# Patient Record
Sex: MALE | Race: WHITE | NOT HISPANIC OR LATINO | Employment: FULL TIME | RURAL
[De-identification: names, ages, dates, MRNs, and addresses within clinical notes are randomized per-mention and may not be internally consistent; named-entity substitution may affect disease eponyms.]

---

## 2022-08-11 ENCOUNTER — OFFICE VISIT (OUTPATIENT)
Dept: FAMILY MEDICINE | Facility: CLINIC | Age: 32
End: 2022-08-11
Payer: COMMERCIAL

## 2022-08-11 VITALS
OXYGEN SATURATION: 95 % | DIASTOLIC BLOOD PRESSURE: 75 MMHG | WEIGHT: 203.63 LBS | HEART RATE: 100 BPM | SYSTOLIC BLOOD PRESSURE: 121 MMHG

## 2022-08-11 DIAGNOSIS — Z28.21 VACCINATION NOT CARRIED OUT BECAUSE OF PATIENT REFUSAL: ICD-10-CM

## 2022-08-11 DIAGNOSIS — F90.9 ATTENTION DEFICIT HYPERACTIVITY DISORDER (ADHD), UNSPECIFIED ADHD TYPE: Primary | ICD-10-CM

## 2022-08-11 PROCEDURE — 99203 OFFICE O/P NEW LOW 30 MIN: CPT | Mod: GC,,, | Performed by: SPECIALIST

## 2022-08-11 PROCEDURE — 3078F PR MOST RECENT DIASTOLIC BLOOD PRESSURE < 80 MM HG: ICD-10-PCS | Mod: CPTII,GC,, | Performed by: SPECIALIST

## 2022-08-11 PROCEDURE — 99203 PR OFFICE/OUTPT VISIT, NEW, LEVL III, 30-44 MIN: ICD-10-PCS | Mod: GC,,, | Performed by: SPECIALIST

## 2022-08-11 PROCEDURE — 3074F SYST BP LT 130 MM HG: CPT | Mod: CPTII,GC,, | Performed by: SPECIALIST

## 2022-08-11 PROCEDURE — 3074F PR MOST RECENT SYSTOLIC BLOOD PRESSURE < 130 MM HG: ICD-10-PCS | Mod: CPTII,GC,, | Performed by: SPECIALIST

## 2022-08-11 PROCEDURE — 3078F DIAST BP <80 MM HG: CPT | Mod: CPTII,GC,, | Performed by: SPECIALIST

## 2022-08-11 RX ORDER — DEXTROAMPHETAMINE SACCHARATE, AMPHETAMINE ASPARTATE MONOHYDRATE, DEXTROAMPHETAMINE SULFATE AND AMPHETAMINE SULFATE 6.25; 6.25; 6.25; 6.25 MG/1; MG/1; MG/1; MG/1
CAPSULE, EXTENDED RELEASE ORAL
COMMUNITY

## 2022-08-11 NOTE — PROGRESS NOTES
Subjective:       Patient ID: Yogi Pedraza is a 31 y.o. male.    Chief Complaint: Medication Refill (ADDERALL)    Patient is a 31 year old male that presents to the clinic today for medication refill. Patient has a PMH of ADHD. Patient has previously well controlled on Adderall XL 25mg. Patient denies headache, chest pain or SOB. 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 The patient states he is moving to [place] and is not interested in completing the health maintenance items.         Current Outpatient Medications:     dextroamphetamine-amphetamine (ADDERALL XR) 25 MG 24 hr capsule, dextroamphetamine-amphetamine ER 25 mg 24hr capsule,extend release  Take 1 capsule every day by oral route in the morning for 30 days.  for focus, Disp: , Rfl:     Review of patient's allergies indicates:  No Known Allergies    Past Medical History:   Diagnosis Date    ADHD (attention deficit hyperactivity disorder)        History reviewed. No pertinent surgical history.    Family History   Family history unknown: Yes       Social History     Tobacco Use    Smoking status: Former Smoker    Smokeless tobacco: Never Used   Substance Use Topics    Alcohol use: Not Currently    Drug use: Not Currently       Review of Systems   Constitutional: Negative for activity change, appetite change and fever.   HENT: Negative for ear discharge, hearing loss and rhinorrhea.    Eyes: Negative for pain, redness and eye dryness.   Respiratory: Negative for cough, chest tightness and shortness of breath.    Cardiovascular: Negative for chest pain.   Gastrointestinal: Negative for abdominal distention and abdominal pain.   Genitourinary: Negative for difficulty urinating.   Musculoskeletal: Negative for back pain.   Neurological: Negative for seizures.   Psychiatric/Behavioral: Negative for confusion, dysphoric mood and hallucinations. The patient is not hyperactive.          Current Medications:    Medication List with Changes/Refills   Current Medications    DEXTROAMPHETAMINE-AMPHETAMINE (ADDERALL XR) 25 MG 24 HR CAPSULE    dextroamphetamine-amphetamine ER 25 mg 24hr capsule,extend release   Take 1 capsule every day by oral route in the morning for 30 days.   for focus       Start Date: --        End Date: --            Objective:        Vitals:    08/11/22 1615   BP: 121/75   Pulse: 100   SpO2: 95%   Weight: 92.4 kg (203 lb 9.6 oz)       Physical Exam  Constitutional:       General: He is not in acute distress.     Appearance: Normal appearance. He is normal weight. He is not ill-appearing.   HENT:      Head: Normocephalic and atraumatic.      Mouth/Throat:      Pharynx: Oropharynx is clear.   Eyes:      Conjunctiva/sclera: Conjunctivae normal.   Cardiovascular:      Rate and Rhythm: Normal rate and regular rhythm.      Pulses: Normal pulses.      Heart sounds: Normal heart sounds.   Pulmonary:      Effort: Pulmonary effort is normal.      Breath sounds: Normal breath sounds.   Abdominal:      General: Abdomen is flat.   Skin:     General: Skin is warm.   Neurological:      Mental Status: He is alert and oriented to person, place, and time.   Psychiatric:         Mood and Affect: Mood normal.         Behavior: Behavior normal.         Thought Content: Thought content normal.         Judgment: Judgment normal.               No results found for: WBC, HGB, HCT, PLT, CHOL, TRIG, HDL, LDLDIRECT, ALT, AST, NA, K, CL, CREATININE, BUN, CO2, TSH, PSA, INR, GLUF, HGBA1C, MICROALBUR   Assessment:       1. Attention deficit hyperactivity disorder (ADHD), unspecified ADHD type        Plan:         Problem List Items Addressed This Visit    None     Visit Diagnoses     Attention deficit hyperactivity disorder (ADHD), unspecified ADHD type    -  Primary    Relevant Orders    POCT Urine Drug Screen Presump            No follow-ups on file.    He Fileds MD     Instructed patient that if symptoms fail to improve or  worsen patient should seek immediate medical attention or report to the nearest emergency department. Patient expressed verbal agreement and understanding to this plan of care.

## 2022-11-02 ENCOUNTER — OFFICE VISIT (OUTPATIENT)
Dept: FAMILY MEDICINE | Facility: CLINIC | Age: 32
End: 2022-11-02

## 2022-11-02 DIAGNOSIS — F90.0 ATTENTION DEFICIT HYPERACTIVITY DISORDER (ADHD), PREDOMINANTLY INATTENTIVE TYPE: Primary | ICD-10-CM

## 2022-11-02 PROCEDURE — 99443 PR PHYSICIAN TELEPHONE EVALUATION 21-30 MIN: ICD-10-PCS | Mod: 95,GC,, | Performed by: FAMILY MEDICINE

## 2022-11-02 PROCEDURE — 99443 PR PHYSICIAN TELEPHONE EVALUATION 21-30 MIN: CPT | Mod: 95,GC,, | Performed by: FAMILY MEDICINE

## 2022-11-14 NOTE — PROGRESS NOTES
I have reviewed the notes, assessments, and/or procedures performed by DR Farris, I concur with his documentation of Yogi Pedraza.

## 2022-11-14 NOTE — PROGRESS NOTES
"Established Patient - Audio Only Telehealth Visit     The patient location is: Recently relocated to [place] for work  The chief complaint leading to consultation is: Needing Adderall refilled, he has not found a local PCP yet  Visit type: Virtual visit with audio only (telephone)  Total time spent with patient: 35       The reason for the audio only service rather than synchronous audio and video virtual visit was related to technical difficulties or patient preference/necessity.     Each patient to whom I provide medical services by telemedicine is:  (1) informed of the relationship between the physician and patient and the respective role of any other health care provider with respect to management of the patient; and (2) notified that they may decline to receive medical services by telemedicine and may withdraw from such care at any time. Patient verbally consented to receive this service via voice-only telephone call.       HPI: PT is a 33 yo male scheduled for telehealth visit today with a cc of "adderall refill." Pt is known to Dr. Stephens and has followed her care for quite some time. PT was recently relocated to [place] for his job. He states that he has been working more hours than normal and has not had a chance to find a local PCP. He is requesting the Dr. Stephens refill his adderall today.      Assessment and plan:    1) ADHD - Pt mailed 3 prescriptions from Dr Stephens for Adderall 25mg Xr                        This service was not originating from a related E/M service provided within the previous 7 days nor will  to an E/M service or procedure within the next 24 hours or my soonest available appointment.  Prevailing standard of care was able to be met in this audio-only visit.          "